# Patient Record
Sex: FEMALE | Race: WHITE | NOT HISPANIC OR LATINO | Employment: OTHER | URBAN - METROPOLITAN AREA
[De-identification: names, ages, dates, MRNs, and addresses within clinical notes are randomized per-mention and may not be internally consistent; named-entity substitution may affect disease eponyms.]

---

## 2021-12-06 ENCOUNTER — OFFICE VISIT (OUTPATIENT)
Dept: FAMILY MEDICINE | Facility: CLINIC | Age: 81
End: 2021-12-06
Payer: COMMERCIAL

## 2021-12-06 VITALS
DIASTOLIC BLOOD PRESSURE: 78 MMHG | SYSTOLIC BLOOD PRESSURE: 132 MMHG | WEIGHT: 116.19 LBS | RESPIRATION RATE: 18 BRPM | OXYGEN SATURATION: 97 % | HEIGHT: 64 IN | TEMPERATURE: 98 F | HEART RATE: 67 BPM | BODY MASS INDEX: 19.84 KG/M2

## 2021-12-06 DIAGNOSIS — R45.1 AGITATION: Primary | ICD-10-CM

## 2021-12-06 DIAGNOSIS — R41.3 MEMORY LOSS: ICD-10-CM

## 2021-12-06 DIAGNOSIS — R41.0 DELIRIUM: ICD-10-CM

## 2021-12-06 PROCEDURE — 99204 PR OFFICE/OUTPT VISIT, NEW, LEVL IV, 45-59 MIN: ICD-10-PCS | Mod: S$GLB,,, | Performed by: NURSE PRACTITIONER

## 2021-12-06 PROCEDURE — 87186 SC STD MICRODIL/AGAR DIL: CPT | Performed by: NURSE PRACTITIONER

## 2021-12-06 PROCEDURE — 87077 CULTURE AEROBIC IDENTIFY: CPT | Performed by: NURSE PRACTITIONER

## 2021-12-06 PROCEDURE — 87086 URINE CULTURE/COLONY COUNT: CPT | Performed by: NURSE PRACTITIONER

## 2021-12-06 PROCEDURE — 81000 URINALYSIS NONAUTO W/SCOPE: CPT | Performed by: NURSE PRACTITIONER

## 2021-12-06 PROCEDURE — 87088 URINE BACTERIA CULTURE: CPT | Performed by: NURSE PRACTITIONER

## 2021-12-06 PROCEDURE — 99204 OFFICE O/P NEW MOD 45 MIN: CPT | Mod: S$GLB,,, | Performed by: NURSE PRACTITIONER

## 2021-12-06 RX ORDER — METOPROLOL SUCCINATE 50 MG/1
50 TABLET, EXTENDED RELEASE ORAL DAILY
COMMUNITY
End: 2021-12-06

## 2021-12-06 RX ORDER — RISPERIDONE 0.25 MG/1
0.25 TABLET ORAL 2 TIMES DAILY PRN
Qty: 20 TABLET | Refills: 2 | Status: SHIPPED | OUTPATIENT
Start: 2021-12-06 | End: 2022-05-18 | Stop reason: SDUPTHER

## 2021-12-06 RX ORDER — METOPROLOL SUCCINATE 50 MG/1
50 TABLET, EXTENDED RELEASE ORAL DAILY
Status: CANCELLED | OUTPATIENT
Start: 2021-12-06

## 2021-12-06 RX ORDER — METOPROLOL SUCCINATE 25 MG/1
25 TABLET, EXTENDED RELEASE ORAL DAILY
Qty: 30 TABLET | Refills: 3 | Status: SHIPPED | OUTPATIENT
Start: 2021-12-06 | End: 2022-06-27 | Stop reason: SDUPTHER

## 2021-12-06 RX ORDER — ESCITALOPRAM OXALATE 10 MG/1
10 TABLET ORAL DAILY
Qty: 30 TABLET | Refills: 5 | Status: SHIPPED | OUTPATIENT
Start: 2021-12-06 | End: 2022-05-18 | Stop reason: DRUGHIGH

## 2021-12-06 RX ORDER — OMEPRAZOLE 40 MG/1
40 CAPSULE, DELAYED RELEASE ORAL DAILY
Qty: 30 CAPSULE | Refills: 3 | Status: SHIPPED | OUTPATIENT
Start: 2021-12-06 | End: 2022-06-29 | Stop reason: SDUPTHER

## 2021-12-06 RX ORDER — TRAZODONE HYDROCHLORIDE 50 MG/1
25 TABLET ORAL NIGHTLY PRN
Qty: 15 TABLET | Refills: 3 | Status: SHIPPED | OUTPATIENT
Start: 2021-12-06 | End: 2022-05-18 | Stop reason: SDUPTHER

## 2021-12-06 RX ORDER — DONEPEZIL HYDROCHLORIDE 10 MG/1
10 TABLET, FILM COATED ORAL NIGHTLY
Qty: 30 TABLET | Refills: 3 | Status: SHIPPED | OUTPATIENT
Start: 2021-12-06 | End: 2022-06-29 | Stop reason: SDUPTHER

## 2021-12-07 LAB
BACTERIA #/AREA URNS HPF: ABNORMAL /HPF
BILIRUB UR QL STRIP: ABNORMAL
CLARITY UR: CLEAR
COLOR UR: YELLOW
GLUCOSE UR QL STRIP: NEGATIVE
HGB UR QL STRIP: ABNORMAL
KETONES UR QL STRIP: ABNORMAL
LEUKOCYTE ESTERASE UR QL STRIP: NEGATIVE
MICROSCOPIC COMMENT: ABNORMAL
NITRITE UR QL STRIP: POSITIVE
PH UR STRIP: 5 [PH] (ref 5–8)
PROT UR QL STRIP: ABNORMAL
RBC #/AREA URNS HPF: 10 /HPF (ref 0–4)
SP GR UR STRIP: >=1.03 (ref 1–1.03)
URN SPEC COLLECT METH UR: ABNORMAL
UROBILINOGEN UR STRIP-ACNC: NEGATIVE EU/DL
WBC #/AREA URNS HPF: 30 /HPF (ref 0–5)

## 2021-12-09 ENCOUNTER — TELEPHONE (OUTPATIENT)
Dept: FAMILY MEDICINE | Facility: CLINIC | Age: 81
End: 2021-12-09
Payer: COMMERCIAL

## 2021-12-09 DIAGNOSIS — R41.0 DELIRIUM: ICD-10-CM

## 2021-12-09 DIAGNOSIS — R41.3 MEMORY LOSS: Primary | ICD-10-CM

## 2021-12-09 DIAGNOSIS — R45.1 AGITATION: ICD-10-CM

## 2021-12-09 RX ORDER — NITROFURANTOIN 25; 75 MG/1; MG/1
100 CAPSULE ORAL 2 TIMES DAILY
Qty: 14 CAPSULE | Refills: 0 | Status: SHIPPED | OUTPATIENT
Start: 2021-12-09 | End: 2022-06-28

## 2021-12-10 ENCOUNTER — TELEPHONE (OUTPATIENT)
Dept: FAMILY MEDICINE | Facility: CLINIC | Age: 81
End: 2021-12-10
Payer: COMMERCIAL

## 2021-12-10 LAB — BACTERIA UR CULT: ABNORMAL

## 2022-05-18 ENCOUNTER — OFFICE VISIT (OUTPATIENT)
Dept: FAMILY MEDICINE | Facility: CLINIC | Age: 82
End: 2022-05-18
Payer: COMMERCIAL

## 2022-05-18 VITALS
HEART RATE: 56 BPM | SYSTOLIC BLOOD PRESSURE: 134 MMHG | DIASTOLIC BLOOD PRESSURE: 80 MMHG | OXYGEN SATURATION: 98 % | TEMPERATURE: 98 F

## 2022-05-18 DIAGNOSIS — S82.891A CLOSED FRACTURE OF RIGHT ANKLE, INITIAL ENCOUNTER: Primary | ICD-10-CM

## 2022-05-18 DIAGNOSIS — R45.1 AGITATION: ICD-10-CM

## 2022-05-18 DIAGNOSIS — R41.3 MEMORY LOSS: ICD-10-CM

## 2022-05-18 PROCEDURE — 3288F FALL RISK ASSESSMENT DOCD: CPT | Mod: CPTII,S$GLB,, | Performed by: FAMILY MEDICINE

## 2022-05-18 PROCEDURE — 1159F MED LIST DOCD IN RCRD: CPT | Mod: CPTII,S$GLB,, | Performed by: FAMILY MEDICINE

## 2022-05-18 PROCEDURE — 3288F PR FALLS RISK ASSESSMENT DOCUMENTED: ICD-10-PCS | Mod: CPTII,S$GLB,, | Performed by: FAMILY MEDICINE

## 2022-05-18 PROCEDURE — 1101F PT FALLS ASSESS-DOCD LE1/YR: CPT | Mod: CPTII,S$GLB,, | Performed by: FAMILY MEDICINE

## 2022-05-18 PROCEDURE — 99214 PR OFFICE/OUTPT VISIT, EST, LEVL IV, 30-39 MIN: ICD-10-PCS | Mod: S$GLB,,, | Performed by: FAMILY MEDICINE

## 2022-05-18 PROCEDURE — 3079F PR MOST RECENT DIASTOLIC BLOOD PRESSURE 80-89 MM HG: ICD-10-PCS | Mod: CPTII,S$GLB,, | Performed by: FAMILY MEDICINE

## 2022-05-18 PROCEDURE — 99214 OFFICE O/P EST MOD 30 MIN: CPT | Mod: S$GLB,,, | Performed by: FAMILY MEDICINE

## 2022-05-18 PROCEDURE — 1160F RVW MEDS BY RX/DR IN RCRD: CPT | Mod: CPTII,S$GLB,, | Performed by: FAMILY MEDICINE

## 2022-05-18 PROCEDURE — 3075F SYST BP GE 130 - 139MM HG: CPT | Mod: CPTII,S$GLB,, | Performed by: FAMILY MEDICINE

## 2022-05-18 PROCEDURE — 1159F PR MEDICATION LIST DOCUMENTED IN MEDICAL RECORD: ICD-10-PCS | Mod: CPTII,S$GLB,, | Performed by: FAMILY MEDICINE

## 2022-05-18 PROCEDURE — 3075F PR MOST RECENT SYSTOLIC BLOOD PRESS GE 130-139MM HG: ICD-10-PCS | Mod: CPTII,S$GLB,, | Performed by: FAMILY MEDICINE

## 2022-05-18 PROCEDURE — 1160F PR REVIEW ALL MEDS BY PRESCRIBER/CLIN PHARMACIST DOCUMENTED: ICD-10-PCS | Mod: CPTII,S$GLB,, | Performed by: FAMILY MEDICINE

## 2022-05-18 PROCEDURE — 1101F PR PT FALLS ASSESS DOC 0-1 FALLS W/OUT INJ PAST YR: ICD-10-PCS | Mod: CPTII,S$GLB,, | Performed by: FAMILY MEDICINE

## 2022-05-18 PROCEDURE — 3079F DIAST BP 80-89 MM HG: CPT | Mod: CPTII,S$GLB,, | Performed by: FAMILY MEDICINE

## 2022-05-18 RX ORDER — ALPRAZOLAM 0.5 MG/1
0.5 TABLET ORAL 2 TIMES DAILY PRN
Qty: 20 TABLET | Refills: 0 | Status: SHIPPED | OUTPATIENT
Start: 2022-05-18

## 2022-05-18 RX ORDER — TRAZODONE HYDROCHLORIDE 50 MG/1
25 TABLET ORAL NIGHTLY PRN
Qty: 30 TABLET | Refills: 3 | Status: SHIPPED | OUTPATIENT
Start: 2022-05-18 | End: 2022-06-29 | Stop reason: SDUPTHER

## 2022-05-18 RX ORDER — RISPERIDONE 0.25 MG/1
0.25 TABLET ORAL 2 TIMES DAILY PRN
Qty: 20 TABLET | Refills: 2 | Status: SHIPPED | OUTPATIENT
Start: 2022-05-18 | End: 2023-05-18

## 2022-05-18 RX ORDER — FLUOXETINE HYDROCHLORIDE 20 MG/1
20 CAPSULE ORAL DAILY
COMMUNITY
Start: 2022-01-08 | End: 2022-05-18 | Stop reason: SDUPTHER

## 2022-05-18 RX ORDER — FLUOXETINE HYDROCHLORIDE 20 MG/1
20 CAPSULE ORAL DAILY
Qty: 30 CAPSULE | Refills: 1 | Status: SHIPPED | OUTPATIENT
Start: 2022-05-18 | End: 2022-09-08

## 2022-05-18 NOTE — LETTER
May 18, 2022      [facility]  [address] [place] [address]  Phone: [phone]  Fax: [phone]       Patient: Michelle Montgomery   YOB: 1940  Date of Visit: 05/18/2022    To Whom It May Concern: SRGP  ER    Titus Montgomery  was at [facility] on 05/18/2022. Last night  Michelle fell at home. I suspect she has a right ankle fracture.  I am arranging home health with \[facility]\"" for after care.Please evaluate and treat If you have any questions or concerns, or if I can be of further assistance, please do not hesitate to contact me.    Sincerely,    Pepito Osborn MD

## 2022-05-18 NOTE — PROGRESS NOTES
Subjective:       Patient ID: Michelle Montgomery is a 81 y.o. female.    Chief Complaint: Home Health Review and broken foot (Has not been evaluated yet, Just happened last night )      Review of patient's allergies indicates:   Allergen Reactions    Clindamycin Other (See Comments)     Severe rash and itching    Severe rash and itching  Severe rash and itching    Sulfa (sulfonamide antibiotics) Other (See Comments)     Rash and Itching      Fell at home last night twisted ankle    Review of Systems   Constitutional: Negative for chills, fatigue, fever and unexpected weight change.   HENT: Negative for ear pain, rhinorrhea, sinus pressure/congestion, sneezing and sore throat.    Eyes: Negative for photophobia and pain.   Respiratory: Negative for chest tightness, shortness of breath and wheezing.    Cardiovascular: Negative for chest pain.   Gastrointestinal: Negative for abdominal distention, abdominal pain, constipation, diarrhea and nausea.   Endocrine: Negative for polydipsia, polyphagia and polyuria.   Genitourinary: Negative for dysuria, frequency, menstrual problem, pelvic pain and urgency.   Musculoskeletal: Negative for back pain. Joint swelling: right ankle.   Integumentary:  Negative for rash, wound, breast mass and breast discharge.   Allergic/Immunologic: Negative for immunocompromised state.   Neurological: Negative for dizziness, vertigo, weakness and headaches.   Psychiatric/Behavioral: Negative for agitation, dysphoric mood and sleep disturbance.   All other systems reviewed and are negative.  Breast: Negative for mass        Objective:      Vitals:    05/18/22 1459   BP: 134/80   Pulse: (!) 56   Temp: 98 °F (36.7 °C)     Physical Exam  Vitals and nursing note reviewed.   Constitutional:       Appearance: Normal appearance.   HENT:      Head: Normocephalic.      Right Ear: Tympanic membrane normal.      Left Ear: Tympanic membrane normal.      Nose: Nose normal.      Mouth/Throat:      Mouth: Mucous  membranes are moist.   Eyes:      Pupils: Pupils are equal, round, and reactive to light.   Cardiovascular:      Rate and Rhythm: Normal rate and regular rhythm.   Pulmonary:      Effort: Pulmonary effort is normal.   Abdominal:      General: Abdomen is flat. Bowel sounds are normal.      Palpations: Abdomen is soft.   Musculoskeletal:         General: Normal range of motion.      Comments: Right foot adducted and pronated  Purple spots on dorusm.. Too painfull to move   Skin:     General: Skin is warm and dry.   Neurological:      General: No focal deficit present.      Mental Status: She is alert.   Psychiatric:         Mood and Affect: Mood normal.         Behavior: Behavior normal.         Assessment:       1. Closed fracture of right ankle, initial encounter    2. Agitation    3. Memory loss        Plan:       Closed fracture of right ankle, initial encounter    Agitation    Memory loss           No follow-ups on file.   Go to [facility] for evaluyation

## 2022-05-20 ENCOUNTER — TELEPHONE (OUTPATIENT)
Dept: FAMILY MEDICINE | Facility: CLINIC | Age: 82
End: 2022-05-20
Payer: COMMERCIAL

## 2022-05-20 PROCEDURE — G0180 PR HOME HEALTH MD CERTIFICATION: ICD-10-PCS | Mod: ,,, | Performed by: FAMILY MEDICINE

## 2022-05-20 PROCEDURE — G0180 MD CERTIFICATION HHA PATIENT: HCPCS | Mod: ,,, | Performed by: FAMILY MEDICINE

## 2022-05-20 NOTE — TELEPHONE ENCOUNTER
----- Message from Morgan Edwards sent at 5/20/2022  1:52 PM CDT -----  Contact:   Type: Needs Medical Advice    Who Called:   Best Call Back Number:[phone] or [phone]    Additional Information: Requesting callback regarding needing to check up on blood pressure medication please call back pt   Please Advise-Thank you

## 2022-05-26 ENCOUNTER — TELEPHONE (OUTPATIENT)
Dept: FAMILY MEDICINE | Facility: CLINIC | Age: 82
End: 2022-05-26
Payer: COMMERCIAL

## 2022-05-26 ENCOUNTER — DOCUMENTATION ONLY (OUTPATIENT)
Dept: FAMILY MEDICINE | Facility: CLINIC | Age: 82
End: 2022-05-26
Payer: COMMERCIAL

## 2022-05-26 DIAGNOSIS — S82.899D CLOSED FRACTURE OF ANKLE WITH ROUTINE HEALING, UNSPECIFIED LATERALITY, SUBSEQUENT ENCOUNTER: ICD-10-CM

## 2022-05-26 DIAGNOSIS — R26.9 GAIT DISORDER: Primary | ICD-10-CM

## 2022-05-26 NOTE — PROGRESS NOTES
Subjective:       Patient ID: Michelle Montgomery is a 81 y.o. female.    Chief Complaint: No chief complaint on file.    HPI  All other systems negative except as stated above.        Review of patient's allergies indicates:   Allergen Reactions    Clindamycin Other (See Comments)     Severe rash and itching    Severe rash and itching  Severe rash and itching    Sulfa (sulfonamide antibiotics) Other (See Comments)     Rash and Itching     Objective:   There were no vitals filed for this visit.  -WEIGHT   There is no height or weight on file to calculate BMI.     Physical Exam    Assessment:       No diagnosis found.    Plan:       There are no diagnoses linked to this encounter.

## 2022-05-26 NOTE — TELEPHONE ENCOUNTER
----- Message from Hannah Gay sent at 5/26/2022 11:18 AM CDT -----  Contact: pt   Type: Needs Medical Advice    Who Called: pt   Best Call Back Number: [phone]  Inquiry/Question: pt  was advise to call and speak with Dr or nurse about getting order for wheel chair for pt per home health request, please advise pt  Thank you~

## 2022-05-26 NOTE — TELEPHONE ENCOUNTER
Patient would like a order for a wheelchair and it sent to DME supplier,  [employer]. Please advice.

## 2022-05-27 ENCOUNTER — TELEPHONE (OUTPATIENT)
Dept: FAMILY MEDICINE | Facility: CLINIC | Age: 82
End: 2022-05-27
Payer: COMMERCIAL

## 2022-05-27 NOTE — TELEPHONE ENCOUNTER
Pt  is requesting to increase metoprolol from 25mg to 50mg daily, stated the 25mg is not working..

## 2022-05-27 NOTE — TELEPHONE ENCOUNTER
----- Message from Isis Rao sent at 5/27/2022  9:38 AM CDT -----  Contact: pt  Type: Needs Medical Advice    Who Called: pt's Junior  Best Call Back Number: [phone]    Inquiry/Question: pt's  would like to speak to a nurse regarding pt's BP Rx    Please call to advise   Thank you~

## 2022-06-01 ENCOUNTER — TELEPHONE (OUTPATIENT)
Dept: FAMILY MEDICINE | Facility: CLINIC | Age: 82
End: 2022-06-01
Payer: COMMERCIAL

## 2022-06-01 NOTE — TELEPHONE ENCOUNTER
Call placed to the patient due to message left. Unable to reach patient due to telephone is not receiving calls and alternate telephone number is an invalid.        Pepito Osborn MD  You; Corry Taveras MA 16 hours ago (5:44 PM)     PM    Ok to do so    Message text       Corry Taveras MA routed conversation to Pepito Osborn MD 5 days ago     Corry Taveras MA 5 days ago     AU       Pt  is requesting to increase metoprolol from 25mg to 50mg daily, stated the 25mg is not working..         Documentation      Corry Taveras MA 5 days ago     AU       ----- Message from Isis Rao sent at 5/27/2022  9:38 AM CDT -----  Contact: pt  Type: Needs Medical Advice     Who Called: pt's Junior Call Back Number: [phone]     Inquiry/Question: pt's  would like to speak to a nurse regarding pt's BP Rx     Please call to advise   Thank you~

## 2022-06-08 ENCOUNTER — EXTERNAL HOME HEALTH (OUTPATIENT)
Dept: HOME HEALTH SERVICES | Facility: HOSPITAL | Age: 82
End: 2022-06-08
Payer: COMMERCIAL

## 2022-06-22 ENCOUNTER — TELEPHONE (OUTPATIENT)
Dept: FAMILY MEDICINE | Facility: CLINIC | Age: 82
End: 2022-06-22
Payer: COMMERCIAL

## 2022-06-22 NOTE — TELEPHONE ENCOUNTER
----- Message from Cecile Ho sent at 6/22/2022 11:29 AM CDT -----  Regarding: low blood pressure 11:37am  Contact: nurse  Type: Needs Medical Advice    Who Called:  home health nurse--CHICHI---Mateus Le Call Back Number: [phone]  Office number [phone]  Additional Information: Requesting a call back regarding he is calling to let you know the pt heart is low at 40 and that was even after exercise.. please call one of the number and advise what to do. #  Please Advise ---Thank you

## 2022-06-22 NOTE — TELEPHONE ENCOUNTER
Spoke with  nurse. States that the patient resting heart rate is in the low 40s. Nurse notes that the patient was sleepy and had just awaken.  Exercised and the heart rate still in low -mid 40s.  HH held patient metoprolol for today due to low heart rate. Will make provider aware.

## 2022-06-23 ENCOUNTER — TELEPHONE (OUTPATIENT)
Dept: FAMILY MEDICINE | Facility: CLINIC | Age: 82
End: 2022-06-23
Payer: COMMERCIAL

## 2022-06-23 NOTE — TELEPHONE ENCOUNTER
Spoke with patient  Junior.  states that the patient is resting at the time of the call. He notes that her BP was 120-60 and HR 45 at 10pm last night and he did not give her the BP medication.  Explained to  that NP recommends as long as her heart rate is regular and blood pressure is in normal range, we can hold the metoprolol if heart rate below 50. NP recommends that she sees cardiology in [place], would followup with them, she may need a holter monitor.  verbalized understanding.

## 2022-06-23 NOTE — TELEPHONE ENCOUNTER
----- Message from Shaunna Edmond NP sent at 6/23/2022  8:25 AM CDT -----  Regarding: RE: low HR  As long as her heart rate is regular and blood pressure is in normal range, we can hold the metoprolol if heart rate below 50. I see that she sees cardiology in [place], would followup with them, she may need a holter monitor.  Shaunna  ----- Message -----  From: Romaine Adam LPN  Sent: 6/22/2022   3:08 PM CDT  To: Shaunna Edmond NP  Subject: low HR                                           Spoke with  nurse. States that the patient resting heart rate is in the low 40s. Nurse notes that the patient was sleepy and had just awaken.  Exercised and the heart rate still in low -mid 40s.  HH held patient metoprolol for today due to low heart rate. Can you review this patient medications so that I can call the  back with instructions.

## 2022-06-27 RX ORDER — METOPROLOL SUCCINATE 25 MG/1
50 TABLET, EXTENDED RELEASE ORAL DAILY
Qty: 180 TABLET | Refills: 1 | Status: SHIPPED | OUTPATIENT
Start: 2022-06-27 | End: 2022-06-28

## 2022-06-27 NOTE — TELEPHONE ENCOUNTER
----- Message from Hannah Gay sent at 6/27/2022 11:43 AM CDT -----  Contact: pt   Type:  RX Refill Request    Who Called:  pt    Refill or New Rx:  refill  RX Name and Strength:  metoprolol succinate (TOPROL-XL) 50 MG 24 hr tablet  How is the patient currently taking it? (ex. 1XDay):  once daily   Is this a 30 day or 90 day RX:  90 day   Preferred Pharmacy with phone number:    [id number] - [place], [place] - [address] [facility]  [id number] [address]  Phone: [phone] Fax: [phone]  Local or Mail Order:  Local  Ordering Provider:  Anurag Le Call Back Number:  [phone]  Additional Information:  please advise once completed, thank you~

## 2022-06-28 ENCOUNTER — OFFICE VISIT (OUTPATIENT)
Dept: FAMILY MEDICINE | Facility: CLINIC | Age: 82
End: 2022-06-28
Payer: COMMERCIAL

## 2022-06-28 VITALS
SYSTOLIC BLOOD PRESSURE: 108 MMHG | WEIGHT: 116 LBS | HEART RATE: 41 BPM | BODY MASS INDEX: 19.81 KG/M2 | DIASTOLIC BLOOD PRESSURE: 64 MMHG | HEIGHT: 64 IN | TEMPERATURE: 98 F | OXYGEN SATURATION: 98 %

## 2022-06-28 DIAGNOSIS — S82.899D CLOSED FRACTURE OF ANKLE WITH ROUTINE HEALING, UNSPECIFIED LATERALITY, SUBSEQUENT ENCOUNTER: ICD-10-CM

## 2022-06-28 DIAGNOSIS — R41.3 MEMORY LOSS: ICD-10-CM

## 2022-06-28 DIAGNOSIS — R26.9 GAIT DISORDER: Primary | ICD-10-CM

## 2022-06-28 DIAGNOSIS — I15.9 SECONDARY HYPERTENSION: ICD-10-CM

## 2022-06-28 PROCEDURE — 1159F MED LIST DOCD IN RCRD: CPT | Mod: CPTII,S$GLB,, | Performed by: FAMILY MEDICINE

## 2022-06-28 PROCEDURE — 1159F PR MEDICATION LIST DOCUMENTED IN MEDICAL RECORD: ICD-10-PCS | Mod: CPTII,S$GLB,, | Performed by: FAMILY MEDICINE

## 2022-06-28 PROCEDURE — 3078F DIAST BP <80 MM HG: CPT | Mod: CPTII,S$GLB,, | Performed by: FAMILY MEDICINE

## 2022-06-28 PROCEDURE — 1160F RVW MEDS BY RX/DR IN RCRD: CPT | Mod: CPTII,S$GLB,, | Performed by: FAMILY MEDICINE

## 2022-06-28 PROCEDURE — 99214 PR OFFICE/OUTPT VISIT, EST, LEVL IV, 30-39 MIN: ICD-10-PCS | Mod: S$GLB,,, | Performed by: FAMILY MEDICINE

## 2022-06-28 PROCEDURE — 1160F PR REVIEW ALL MEDS BY PRESCRIBER/CLIN PHARMACIST DOCUMENTED: ICD-10-PCS | Mod: CPTII,S$GLB,, | Performed by: FAMILY MEDICINE

## 2022-06-28 PROCEDURE — 3074F PR MOST RECENT SYSTOLIC BLOOD PRESSURE < 130 MM HG: ICD-10-PCS | Mod: CPTII,S$GLB,, | Performed by: FAMILY MEDICINE

## 2022-06-28 PROCEDURE — 3074F SYST BP LT 130 MM HG: CPT | Mod: CPTII,S$GLB,, | Performed by: FAMILY MEDICINE

## 2022-06-28 PROCEDURE — 3078F PR MOST RECENT DIASTOLIC BLOOD PRESSURE < 80 MM HG: ICD-10-PCS | Mod: CPTII,S$GLB,, | Performed by: FAMILY MEDICINE

## 2022-06-28 PROCEDURE — 99214 OFFICE O/P EST MOD 30 MIN: CPT | Mod: S$GLB,,, | Performed by: FAMILY MEDICINE

## 2022-06-28 NOTE — PROGRESS NOTES
Patient returned to pre/post area. Alert and oriented, denies pain. Left radial palpable and strong distal to puncture site, yaron WEBSTER d/t pressure dressing. No bleeding/redness/swelling noted. Will continue to monitor. Subjective:       Patient ID: Michelle Montgomery is a 82 y.o. female.    Chief Complaint: Follow-up (On foot sprain and home health.)      Review of patient's allergies indicates:   Allergen Reactions    Clindamycin Other (See Comments)     Severe rash and itching    Severe rash and itching  Severe rash and itching    Sulfa (sulfonamide antibiotics) Other (See Comments)     Rash and Itching       here says BP is low    Review of Systems   Constitutional: Negative for chills, fatigue, fever and unexpected weight change.   HENT: Negative for ear pain, rhinorrhea, sinus pressure/congestion, sneezing and sore throat.    Eyes: Negative for photophobia and pain.   Respiratory: Negative for chest tightness, shortness of breath and wheezing.    Cardiovascular: Negative for chest pain.   Gastrointestinal: Negative for abdominal distention, abdominal pain, constipation, diarrhea and nausea.   Endocrine: Negative for polydipsia, polyphagia and polyuria.   Genitourinary: Negative for dysuria, frequency, menstrual problem, pelvic pain and urgency.   Musculoskeletal: Negative for back pain and joint swelling.   Integumentary:  Negative for rash, wound, breast mass and breast discharge.   Allergic/Immunologic: Negative for immunocompromised state.   Neurological: Negative for dizziness, vertigo, weakness and headaches.   Psychiatric/Behavioral: Negative for agitation, dysphoric mood and sleep disturbance.   All other systems reviewed and are negative.  Breast: Negative for mass        Objective:      Vitals:    06/28/22 1507   BP: 108/64   Pulse: (!) 41   Temp: 97.8 °F (36.6 °C)     Physical Exam  Vitals and nursing note reviewed.   Constitutional:       Appearance: Normal appearance.   HENT:      Head: Normocephalic.      Right Ear: Tympanic membrane normal.      Left Ear: Tympanic membrane normal.      Nose: Nose normal.      Mouth/Throat:      Mouth: Mucous membranes are moist.   Eyes:      Pupils: Pupils are equal, round,  and reactive to light.   Cardiovascular:      Rate and Rhythm: Normal rate and regular rhythm.   Pulmonary:      Effort: Pulmonary effort is normal.   Abdominal:      General: Abdomen is flat. Bowel sounds are normal.      Palpations: Abdomen is soft.   Musculoskeletal:         General: Normal range of motion.   Skin:     General: Skin is warm and dry.   Neurological:      General: No focal deficit present.      Mental Status: She is alert.   Psychiatric:         Mood and Affect: Mood normal.         Behavior: Behavior normal.         Assessment:       1. Gait disorder    2. Closed fracture of ankle with routine healing, unspecified laterality, subsequent encounter    3. Memory loss    4. Secondary hypertension        Plan:       Gait disorder    Closed fracture of ankle with routine healing, unspecified laterality, subsequent encounter    Memory loss    Secondary hypertension         Monitor BP  She is getting more feeble  No follow-ups on file.   Stop metoprolol

## 2022-06-29 ENCOUNTER — TELEPHONE (OUTPATIENT)
Dept: FAMILY MEDICINE | Facility: CLINIC | Age: 82
End: 2022-06-29
Payer: COMMERCIAL

## 2022-06-29 RX ORDER — TRAZODONE HYDROCHLORIDE 50 MG/1
25 TABLET ORAL NIGHTLY PRN
Qty: 30 TABLET | Refills: 3 | Status: SHIPPED | OUTPATIENT
Start: 2022-06-29 | End: 2023-06-29

## 2022-06-29 RX ORDER — DONEPEZIL HYDROCHLORIDE 10 MG/1
10 TABLET, FILM COATED ORAL NIGHTLY
Qty: 30 TABLET | Refills: 3 | Status: SHIPPED | OUTPATIENT
Start: 2022-06-29 | End: 2023-06-29

## 2022-06-29 RX ORDER — OMEPRAZOLE 40 MG/1
40 CAPSULE, DELAYED RELEASE ORAL DAILY
Qty: 30 CAPSULE | Refills: 3 | Status: SHIPPED | OUTPATIENT
Start: 2022-06-29 | End: 2023-06-29

## 2022-06-29 NOTE — TELEPHONE ENCOUNTER
----- Message from Cecile Ho sent at 6/29/2022  2:51 PM CDT -----  Contact: PT  Type:  RX Refill Request  THEY NEED A PRIOR AUTHORIZATION , WHY NOT FILLED EARLIER.       Who Called:  the patient  Refill or New Rx:  REFILL  RX Name and Strength:    1. omeprazole (PRILOSEC) 40 MG capsule 30 capsule   Sig - Route: Take 1 capsule (40 mg total) by mouth once daily. - Oral    2. traZODone (DESYREL) 50 MG tablet 30 tablet    Sig - Route: Take 0.5 tablets (25 mg total) by mouth nightly as needed for Insomnia. - Oral    3.   donepeziL (ARICEPT) 10 MG tablet 30 tablet   Sig - Route: Take 1 tablet (10 mg total) by mouth every evening. - Oral      How is the patient currently taking it? (ex. 1XDay):  AS ORDER  Is this a 30 day or 90 day RX:  AS ORDER  Preferred Pharmacy with phone number:        [id number] - [place], [place] - [address] [facility]  [id number] [address]  Phone: [phone] Fax: [phone]      Local or Mail Order:    Ordering Provider:  ANNE Le Call Back Number:  [phone]  Additional Information:

## 2022-06-29 NOTE — TELEPHONE ENCOUNTER
Nurse states that the patient has HR of 45. Nurse aware to hold BP medications. Patient has appointment with Cardiology scheduled for next month. Aware to monitor patient until appointment.

## 2022-06-30 ENCOUNTER — TELEPHONE (OUTPATIENT)
Dept: PRIMARY CARE CLINIC | Facility: CLINIC | Age: 82
End: 2022-06-30
Payer: COMMERCIAL

## 2022-06-30 NOTE — TELEPHONE ENCOUNTER
----- Message from Cecile Ho sent at 6/30/2022  1:18 PM CDT -----  Contact: pt  2ND EMAIL      CHICHI is calling to report that pt heart rate is low at 46.. please call  Nurse Mateus at [phone]    Blood pressure 121/66-- pt feels fine he said.     Thanks

## 2022-08-31 DIAGNOSIS — Z78.0 MENOPAUSE: ICD-10-CM

## 2023-09-21 DIAGNOSIS — Z78.0 MENOPAUSE: ICD-10-CM
